# Patient Record
(demographics unavailable — no encounter records)

---

## 2017-04-10 NOTE — RAD
HISTORY:

fever/cough  



COMPARISON:

No prior. 



FINDINGS:



LUNGS:

No active pulmonary disease.



PLEURA:

No significant pleural effusion identified, no pneumothorax apparent.



CARDIOVASCULAR:

Normal.



OSSEOUS STRUCTURES:

No significant abnormalities.



VISUALIZED UPPER ABDOMEN:

Normal.



OTHER FINDINGS:

None.



IMPRESSION:

No active disease.

## 2017-04-10 NOTE — EDPD
Arrival/HPI





- General


Chief Complaint: Fever


Time Seen by Provider: 04/10/17 11:05


Historian: Patient, Parent





- History of Present Illness


Narrative History of Present Illness (Text): 





04/10/17 11:14


14 y/o male, no significant pmh, nkda, bib father, c/o bodyahce/sorethroat and 

fever started yesterday. sorethroat, associated with the bodyache, tmax unknown

, last dose motrin about 3 hours ago and no tylenol given, no dizziness, no 

night sweat, no rash, no palpitation, no nausea or vomiting, no other medical 

or psychological complaints. 





Past Medical History





- Provider Review


Nursing Documentation Reviewed: Yes





- Immunization


Tetanus Immunization: Up to Date





- Medical History


Past Medical History: No Previous


Common Medical Problems: No Medical History





- Psychiatric History


Past Psychiatric History: None


Hx Physical Abuse: No


Hx Emotional Abuse: No


Hx Depression: No





- Surgical History


Past Surgical History: No Previous


Surgeries: No Surgical History





- Suicidal Assessment


Feels Threatened at Home: No





Family/Social History





- Physician Review


Nursing Documentation Reviewed: Yes


Family/Social History: Unknown Family HX


Hx Substance Use:  (not)





Allergies/Home Meds


Allergies/Adverse Reactions: 


Allergies





No Known Allergies Allergy (Verified 04/10/17 11:01)


 











Pediatric Review of Systems





- Review of Systems


Constitutional: Fatigue, Fevers


Eyes: absent: Vision Changes


ENT: Sore Throat, Rhinorrhea.  absent: Hearing Changes


Respiratory: absent: Cough, Sputum


Cardiovascular: absent: Chest Pain


Gastrointestinal: absent: Abdominal Pain, Diarrhea, Nausea, Vomitting


Musculoskeletal: Myalgias.  absent: Arthralgias, Back Pain, Neck Pain, Joint 

Swelling


Neurologic: absent: Headache, Dizziness, Focal Weakness, Gait Changes, Seizures





Pediatric Physical Exam


Vital Signs Reviewed: Yes


Vital Signs











  Temp Pulse Resp BP Pulse Ox


 


 04/10/17 12:23  98.9 F  98  18  118/65  98


 


 04/10/17 11:38  100.7 F H    


 


 04/10/17 11:02  100.7 F H  114 H  16  120/66  97











Temperature: Febrile


Blood Pressure: Normal


Pulse: Tachycardic


Respiratory Rate: Normal


Appearance: Positive for: Well-Appearing, Non-Toxic, Comfortable, Happy, Playful


Pain Distress: None


Mental Status: Positive for: Alert and Oriented X 3





- Systems Exam


Head: Present: Atraumatic, Normal [place], Normocephalic


Pupils: Present: PERRL


Extroacular Muscles: Present: EOMI


Conjunctiva: Present: Normal


Ears: Present: Other (Ears: Lt. TM erythematous and intact, rt. TM noreen color 

and intact, bilateral auditory canals non-erythematous, no mastoid tenderness.)


Mouth: Present: Moist Mucous Membranes


Pharnyx: No: ERYTHEMA, EXUDATE, TONSILS ENLARGED, Peritonsilar Swelling, Uvular 

Deviation, Muffled/Hoarse Voice, [place], Soft Palate/Uvular Edema


Nose (External): Present: Atraumatic.  No: Abrasion, Contusion


Nose (Internal): Present: Normal Inspection, No Active Bleeding, Rhinorrhea.  No

: Septal Deviation, Septal Hematoma


Neck: Present: Normal Range of Motion, Trachea Midline.  No: Meningeal Signs, 

Lymphadenopathy


Respiratory/Chest: Present: Clear to Auscultation, Good Air Exchange.  No: 

Respiratory Distress, Accessory Muscle Use


Cardiovascular: Present: Regular Rate and Rhythm, Normal S1, S2.  No: Murmurs


Abdomen: Present: Normal Bowel Sounds.  No: Tenderness, Distention, Peritoneal 

Signs, Rebound, Guarding


Back: Present: GCS, CN, SP


Upper Extremity: Present: Normal Inspection.  No: Cyanosis, Edema


Lower Extremity: Present: Normal Inspection.  No: Edema


Neurological: Present: GCS=15, Speech Normal, Gait Normal, Memory Normal


Skin: Present: Warm, Dry, Normal Color.  No: Rashes


Lymphatic: Present: OX3, NI, NC


Psychiatric: Present: Alert, Oriented x 3, Normal Insight, Normal Concentration





Medical Decision Making


ED Course and Treatment: 


04/10/17 11:21


-rapid flu


-tylenol


-observe and reassess





04/10/17 13:29


-Fever resolved


-Chest x-ray show no acute findings.


-Pt. has no neck stiffness or rash, no headache, no abdominal pain.


-Pt. feels much better


-Clinical suspicious for influenza is high.


-Discharge home with augmentin, tamiflu, tylenol, stay hydrated, bed rest, 

follow up with your own pmd and ENT within 2 days, return to the ER for any new 

or worsening signs or symptoms. 





- Lab Interpretations


Lab Results: 


 Lab Results





04/10/17 11:15: Influenza Typ A,B (EIA) Negative for flu a/b











- RAD Interpretation


Radiology Orders: 








04/10/17 12:22


CHEST PORTABLE [RAD] Stat 














- Medication Orders


Current Medication Orders: 











Discontinued Medications





Acetaminophen (Tylenol 325mg Tab)  650 mg PO STAT STA


   Stop: 04/10/17 11:21


   Last Admin: 04/10/17 11:38  Dose: 650 MG





MAR Pain/Vitals


 Document     04/10/17 11:38  SS  (Rec: 04/10/17 11:39  SS  [id number])


     Pain Reassessment


      Is This A Pain ReAssessment?               No


     Sleep


      Is patient sleeping during reassessment?   No


     Presence of Pain


      Presence of Pain                           Yes


     Pain Scale Used


      Pain Scale Used                            Numeric


     Location


      Pain Location Body Site                    Generalized


      Intensity                                  5


     Vitals


      Temperature (97.6 F-99.6 F)                100.7 F


      Temperature Source                         Oral





Amoxicillin/Clavulanate Potassium (Augmentin 875 Mg-125 Mg Tab)  1 tab PO STAT 

STA


   PRN Reason: Protocol


   Stop: 04/10/17 12:23











- PA / NP / Resident Statement


MD/ has reviewed & agrees with the documentation as recorded.





Disposition/Present on Arrival





- Present on Arrival


Any Indicators Present on Arrival: No


History of DVT/PE: No


History of Uncontrolled Diabetes: No


Urinary Catheter: No


History of Decub. Ulcer: No


History Surgical Site Infection Following: None





- Disposition


Have Diagnosis and Disposition been Completed?: Yes


Diagnosis: 


 Influenza, Otitis media


Disposition: HOME/ ROUTINE


Disposition Time: 11:22


Patient Plan: Discharge


Patient Problems: 


 Current Active Problems











Problem Status Diagnosed


 


Influenza Acute 


 


Otitis media Acute 











Condition: IMPROVED


Additional Instructions: 


Discharge home with augmentin, tamiflu, tylenol, stay hydrated, bed rest, 

follow up with your own pmd and ENT within 2 days, return to the ER for any new 

or worsening signs or symptoms. 


Prescriptions: 


Amoxicillin/Clavulanate [Augmentin 875 MG-125 MG] 1 tab PO BID #20 tab


Oseltamivir [Tamiflu] 75 mg PO BID #10 cap


Acetaminophen [Tylenol 325mg tab] 2 tab PO QID PRN #30 tab


 PRN Reason: Other


Referrals: 


Antonio Narayan MD [Primary Care Provider] - Follow up with primary


Ajay Orta DO [Staff Provider] - Follow up with primary


Forms:  SCHOOL NOTE

## 2017-11-29 NOTE — ED PDOC
Arrival/HPI





- General


Chief Complaint: ENT Problem


Time Seen by Provider: 11/29/17 21:13


Historian: Patient, Parent





- History of Present Illness


Narrative History of Present Illness (Text): 


11/29/17 21:38


A 14 year old male presents to the emergency department accompanied by parent 

complaining of malaise, increase fatigue and decrease appetite for the past 4-5 

days. Patient reports generalized headache, sore throat, and dry cough. He also 

notes left sided ear pain with decrementation of hearing which began yesterday. 

Patient denies any fever, chills, nausea, vomiting, rashes, meningococcal rash, 

nuchal rigidity, focal neurological deficits or any other complaints. Patient 

denies any sick contact.


 


Time/Duration: Other (4-5 days)


Symptom Course: Unchanged


Quality: Other


Context: Home





Past Medical History





- Provider Review


Nursing Documentation Reviewed: Yes





- Past History


Past History: No Previous





- Tetanus Immunization


Tetanus Immunization: Up to Date





- Psychiatric


Hx Substance Use: No





- Past Surgical History


Past Surgical History: No Previous





- Suicidal Assessment


Feels Threatened In Home Enviroment: No





Family/Social History





- Physician Review


Nursing Documentation Reviewed: Yes


Family/Social History: No Known Family HX


Smoking Status: Never Smoked


Hx Alcohol Use: No


Hx Substance Use: No





Allergies/Home Meds


Allergies/Adverse Reactions: 


Allergies





No Known Allergies Allergy (Verified 11/29/17 20:38)


 











Review of Systems





- Physician Review


All systems were reviewed & negative as marked: Yes





- Review of Systems


Constitutional: Fatigue, Other (Malaise).  absent: Fevers, Night Sweats


ENT: Hearing Changes (decrementation of hearing), TMJ Pain (Left sided ear pain)

, Sore Throat


Respiratory: Cough.  absent: Sputum


Gastrointestinal: Appetite Changes.  absent: Nausea, Vomiting


Musculoskeletal: absent: Other (nuchal rigidity)


Skin: absent: Rash (meningococcal rash)


Neurological: Headache.  absent: Focal Weakness





Physical Exam


Vital Signs Reviewed: Yes


Vital Signs











  Temp Pulse Resp BP Pulse Ox


 


 11/29/17 20:38  98.1 F  95  16  129/80  99











Temperature: Afebrile


Blood Pressure: Normal


Pulse: Regular


Respiratory Rate: Normal


Appearance: Positive for: Well-Appearing, Non-Toxic, Comfortable


Pain Distress: None


Mental Status: Positive for: Alert and Oriented X 3





- Systems Exam


Head: Present: Atraumatic, Normocephalic


Pupils: Present: PERRL


Extroacular Muscles: Present: EOMI


Conjunctiva: Present: Normal


Ears: Present: Other (Left TM ejected)


Mouth: Present: Moist Mucous Membranes


Pharnyx: Present: ERYTHEMA.  No: EXUDATE, TONSILS ENLARGED


Neck: Present: Normal Range of Motion, Lymphadenopathy (Bilateral submandibular 

lymphadenopathy)


Respiratory/Chest: Present: Clear to Auscultation, Good Air Exchange.  No: 

Respiratory Distress, Accessory Muscle Use


Cardiovascular: Present: Regular Rate and Rhythm, Normal S1, S2.  No: Murmurs


Abdomen: Present: Normal Bowel Sounds.  No: Tenderness, Distention, Peritoneal 

Signs


Back: Present: Normal Inspection


Upper Extremity: Present: Normal Inspection.  No: Cyanosis, Edema


Lower Extremity: Present: Normal Inspection.  No: Edema


Neurological: Present: GCS=15, CN II-XII Intact, Speech Normal


Skin: Present: Warm, Dry, Normal Color.  No: Rashes


Psychiatric: Present: Alert, Oriented x 3, Normal Insight, Normal Concentration





Medical Decision Making


ED Course and Treatment: 


11/29/17 21:38


Impression:


A 14 year old female with increased fatigue and decrease appetite. Patient 

notes headache, sore throat, cough and left ear pain.





Plan:


-- Tylenol, Amoxicillin and Ibuprofen


-- Reassess and disposition





Progress Notes:











- Medication Orders


Current Medication Orders: 











Discontinued Medications





Acetaminophen (Tylenol 325mg Tab)  650 mg PO STAT STA


   Stop: 11/29/17 21:36


   Last Admin: 11/29/17 21:51  Dose: 650 mg





MAR Pain/Vitals


 Document     11/29/17 21:51  RD  (Rec: 11/29/17 21:51  RD  [id number])


     Pain Reassessment


      Is This A Pain ReAssessment?               No


     Sleep


      Is patient sleeping during reassessment?   No


     Presence of Pain


      Presence of Pain                           Yes





Amoxicillin (Amoxil 500 Mg Cap)  500 mg PO STAT STA


   PRN Reason: Protocol


   Stop: 11/29/17 21:35


   Last Admin: 11/29/17 21:51  Dose: 500 mg





Ibuprofen (Motrin Tab)  600 mg PO STAT STA


   Stop: 11/29/17 21:36


   Last Admin: 11/29/17 21:51  Dose: 600 mg





MAR Pain/Vitals


 Document     11/29/17 21:51  RD  (Rec: 11/29/17 21:51  RD  [id number])


     Pain Reassessment


      Is This A Pain ReAssessment?               No


     Sleep


      Is patient sleeping during reassessment?   No


     Presence of Pain


      Presence of Pain                           Yes














- Scribe Statement


The provider has reviewed the documentation as recorded by the Jacquelynibpolina Morgan





Provider Scribe Attestation:


All medical record entries made by the Scribe were at my direction and 

personally dictated by me. I have reviewed the chart and agree that the record 

accurately reflects my personal performance of the history, physical exam, 

medical decision making, and the department course for this patient. I have 

also personally directed, reviewed, and agree with the discharge instructions 

and disposition.








Disposition/Present on Arrival





- Present on Arrival


Any Indicators Present on Arrival: No


History of DVT/PE: No


History of Uncontrolled Diabetes: No


Urinary Catheter: No


History of Decub. Ulcer: No


History Surgical Site Infection Following: None





- Disposition


Have Diagnosis and Disposition been Completed?: Yes


Diagnosis: 


 Viral syndrome





Disposition: HOME/ ROUTINE


Disposition Time: 22:00


Patient Plan: Discharge


Condition: IMPROVED


Discharge Instructions (ExitCare):  Otitis Media (ED), Viral Syndrome (ED)


Print Language: ENGLISH


Additional Instructions: 


Drink plenty of water, avoid dairy and dairy products, herbal teas (such as 

lucas/fresh lemon/ pepeprmint) w/ pasteurized honey and fresh lemon, fresh raw 

vegetable and fruit juices, soups , extra rest can aid in your recovery.  


Prescriptions: 


Acetaminophen [Tylenol 325mg tab] 650 mg PO Q6 PRN #30 tab


 PRN Reason: Headache


Amoxicillin 500 mg PO TID #30 tablet


Ibuprofen [Motrin Tab] 600 mg PO Q6 PRN #30 tab


 PRN Reason: Headache


Forms:  [facility] (English), SCHOOL NOTE